# Patient Record
Sex: MALE | Race: OTHER | HISPANIC OR LATINO | URBAN - METROPOLITAN AREA
[De-identification: names, ages, dates, MRNs, and addresses within clinical notes are randomized per-mention and may not be internally consistent; named-entity substitution may affect disease eponyms.]

---

## 2023-08-02 ENCOUNTER — EMERGENCY (EMERGENCY)
Facility: HOSPITAL | Age: 48
LOS: 1 days | Discharge: HOME | End: 2023-08-02
Attending: STUDENT IN AN ORGANIZED HEALTH CARE EDUCATION/TRAINING PROGRAM
Payer: MEDICAID

## 2023-08-02 VITALS
HEART RATE: 54 BPM | SYSTOLIC BLOOD PRESSURE: 144 MMHG | TEMPERATURE: 98 F | DIASTOLIC BLOOD PRESSURE: 68 MMHG | RESPIRATION RATE: 18 BRPM | OXYGEN SATURATION: 96 %

## 2023-08-02 VITALS
HEIGHT: 72 IN | HEART RATE: 60 BPM | RESPIRATION RATE: 18 BRPM | TEMPERATURE: 98 F | OXYGEN SATURATION: 96 % | SYSTOLIC BLOOD PRESSURE: 146 MMHG | DIASTOLIC BLOOD PRESSURE: 88 MMHG | WEIGHT: 270.07 LBS

## 2023-08-02 LAB
ALBUMIN SERPL ELPH-MCNC: 3.4 G/DL — LOW (ref 3.5–5)
ALP SERPL-CCNC: 65 U/L — SIGNIFICANT CHANGE UP (ref 40–120)
ALT FLD-CCNC: 40 U/L DA — SIGNIFICANT CHANGE UP (ref 10–60)
ANION GAP SERPL CALC-SCNC: 4 MMOL/L — LOW (ref 5–17)
AST SERPL-CCNC: 16 U/L — SIGNIFICANT CHANGE UP (ref 10–40)
BASOPHILS # BLD AUTO: 0.04 K/UL — SIGNIFICANT CHANGE UP (ref 0–0.2)
BASOPHILS NFR BLD AUTO: 0.8 % — SIGNIFICANT CHANGE UP (ref 0–2)
BILIRUB SERPL-MCNC: 0.3 MG/DL — SIGNIFICANT CHANGE UP (ref 0.2–1.2)
BUN SERPL-MCNC: 19 MG/DL — HIGH (ref 7–18)
CALCIUM SERPL-MCNC: 8.8 MG/DL — SIGNIFICANT CHANGE UP (ref 8.4–10.5)
CHLORIDE SERPL-SCNC: 105 MMOL/L — SIGNIFICANT CHANGE UP (ref 96–108)
CO2 SERPL-SCNC: 27 MMOL/L — SIGNIFICANT CHANGE UP (ref 22–31)
CREAT SERPL-MCNC: 0.85 MG/DL — SIGNIFICANT CHANGE UP (ref 0.5–1.3)
EGFR: 109 ML/MIN/1.73M2 — SIGNIFICANT CHANGE UP
EOSINOPHIL # BLD AUTO: 0.03 K/UL — SIGNIFICANT CHANGE UP (ref 0–0.5)
EOSINOPHIL NFR BLD AUTO: 0.6 % — SIGNIFICANT CHANGE UP (ref 0–6)
GLUCOSE SERPL-MCNC: 106 MG/DL — HIGH (ref 70–99)
HCT VFR BLD CALC: 45.8 % — SIGNIFICANT CHANGE UP (ref 39–50)
HGB BLD-MCNC: 14.9 G/DL — SIGNIFICANT CHANGE UP (ref 13–17)
IMM GRANULOCYTES NFR BLD AUTO: 0.2 % — SIGNIFICANT CHANGE UP (ref 0–0.9)
LYMPHOCYTES # BLD AUTO: 2.27 K/UL — SIGNIFICANT CHANGE UP (ref 1–3.3)
LYMPHOCYTES # BLD AUTO: 45.7 % — HIGH (ref 13–44)
MAGNESIUM SERPL-MCNC: 2.1 MG/DL — SIGNIFICANT CHANGE UP (ref 1.6–2.6)
MCHC RBC-ENTMCNC: 28.3 PG — SIGNIFICANT CHANGE UP (ref 27–34)
MCHC RBC-ENTMCNC: 32.5 GM/DL — SIGNIFICANT CHANGE UP (ref 32–36)
MCV RBC AUTO: 87.1 FL — SIGNIFICANT CHANGE UP (ref 80–100)
MONOCYTES # BLD AUTO: 0.51 K/UL — SIGNIFICANT CHANGE UP (ref 0–0.9)
MONOCYTES NFR BLD AUTO: 10.3 % — SIGNIFICANT CHANGE UP (ref 2–14)
NEUTROPHILS # BLD AUTO: 2.11 K/UL — SIGNIFICANT CHANGE UP (ref 1.8–7.4)
NEUTROPHILS NFR BLD AUTO: 42.4 % — LOW (ref 43–77)
NRBC # BLD: 0 /100 WBCS — SIGNIFICANT CHANGE UP (ref 0–0)
PLATELET # BLD AUTO: 214 K/UL — SIGNIFICANT CHANGE UP (ref 150–400)
POTASSIUM SERPL-MCNC: 4 MMOL/L — SIGNIFICANT CHANGE UP (ref 3.5–5.3)
POTASSIUM SERPL-SCNC: 4 MMOL/L — SIGNIFICANT CHANGE UP (ref 3.5–5.3)
PROT SERPL-MCNC: 6.9 G/DL — SIGNIFICANT CHANGE UP (ref 6–8.3)
RBC # BLD: 5.26 M/UL — SIGNIFICANT CHANGE UP (ref 4.2–5.8)
RBC # FLD: 13.6 % — SIGNIFICANT CHANGE UP (ref 10.3–14.5)
SODIUM SERPL-SCNC: 136 MMOL/L — SIGNIFICANT CHANGE UP (ref 135–145)
TROPONIN I, HIGH SENSITIVITY RESULT: 5.7 NG/L — SIGNIFICANT CHANGE UP
WBC # BLD: 4.97 K/UL — SIGNIFICANT CHANGE UP (ref 3.8–10.5)
WBC # FLD AUTO: 4.97 K/UL — SIGNIFICANT CHANGE UP (ref 3.8–10.5)

## 2023-08-02 PROCEDURE — 99053 MED SERV 10PM-8AM 24 HR FAC: CPT

## 2023-08-02 PROCEDURE — 93005 ELECTROCARDIOGRAM TRACING: CPT

## 2023-08-02 PROCEDURE — 99285 EMERGENCY DEPT VISIT HI MDM: CPT

## 2023-08-02 PROCEDURE — 99284 EMERGENCY DEPT VISIT MOD MDM: CPT | Mod: 25

## 2023-08-02 PROCEDURE — 36415 COLL VENOUS BLD VENIPUNCTURE: CPT

## 2023-08-02 PROCEDURE — 85025 COMPLETE CBC W/AUTO DIFF WBC: CPT

## 2023-08-02 PROCEDURE — 84484 ASSAY OF TROPONIN QUANT: CPT

## 2023-08-02 PROCEDURE — 80053 COMPREHEN METABOLIC PANEL: CPT

## 2023-08-02 PROCEDURE — 96374 THER/PROPH/DIAG INJ IV PUSH: CPT

## 2023-08-02 PROCEDURE — 83735 ASSAY OF MAGNESIUM: CPT

## 2023-08-02 PROCEDURE — 96375 TX/PRO/DX INJ NEW DRUG ADDON: CPT

## 2023-08-02 RX ORDER — METOCLOPRAMIDE HCL 10 MG
10 TABLET ORAL ONCE
Refills: 0 | Status: COMPLETED | OUTPATIENT
Start: 2023-08-02 | End: 2023-08-02

## 2023-08-02 RX ORDER — KETOROLAC TROMETHAMINE 30 MG/ML
30 SYRINGE (ML) INJECTION ONCE
Refills: 0 | Status: DISCONTINUED | OUTPATIENT
Start: 2023-08-02 | End: 2023-08-02

## 2023-08-02 RX ORDER — SODIUM CHLORIDE 9 MG/ML
1000 INJECTION INTRAMUSCULAR; INTRAVENOUS; SUBCUTANEOUS ONCE
Refills: 0 | Status: COMPLETED | OUTPATIENT
Start: 2023-08-02 | End: 2023-08-02

## 2023-08-02 RX ADMIN — Medication 30 MILLIGRAM(S): at 08:46

## 2023-08-02 RX ADMIN — Medication 10 MILLIGRAM(S): at 08:46

## 2023-08-02 RX ADMIN — SODIUM CHLORIDE 1000 MILLILITER(S): 9 INJECTION INTRAMUSCULAR; INTRAVENOUS; SUBCUTANEOUS at 08:46

## 2023-08-02 NOTE — ED PROVIDER NOTE - PATIENT PORTAL LINK FT
You can access the FollowMyHealth Patient Portal offered by [facility] by registering at the following website: [email or web address]. By joining [facility]’s FollowMyHealth portal, you will also be able to view your health information using other applications (apps) compatible with our system.

## 2023-08-02 NOTE — ED ADULT NURSE NOTE - CHIEF COMPLAINT QUOTE
Pt c/o nose bleed that started at 4:30am this morning, no bleeding noted pt c/o feeling dizzy "like the room is moving' and headache.

## 2023-08-02 NOTE — ED ADULT NURSE NOTE - NSFALLRISKINTERV_ED_ALL_ED
Assistance OOB with selected safe patient handling equipment if applicable/Assistance with ambulation/Communicate fall risk and risk factors to all staff, patient, and family/Encourage patient to sit up slowly, dangle for a short time, stand at bedside before walking/Monitor gait and stability/Orthostatic vital signs/Provide patient with walking aids/Provide visual cue: yellow wristband, yellow gown, etc/Reinforce activity limits and safety measures with patient and family/Call bell, personal items and telephone in reach/Instruct patient to call for assistance before getting out of bed/chair/stretcher/Non-slip footwear applied when patient is off stretcher/[place] to call system/Physically safe environment - no spills, clutter or unnecessary equipment/Purposeful Proactive Rounding/Room/bathroom lighting operational, light cord in reach

## 2023-08-02 NOTE — ED PROVIDER NOTE - PROGRESS NOTE DETAILS
patient and wife updated on results. symptoms improved.  stable for outpatient followup. leno Louise

## 2023-08-02 NOTE — ED ADULT TRIAGE NOTE - CHIEF COMPLAINT QUOTE
Pt c/o nose bleed that started at 4:30am this morning, no bleeding noted pt c/o feeling dizzy "like the room is moving" Pt c/o nose bleed that started at 4:30am this morning, no bleeding noted pt c/o feeling dizzy "like the room is moving' and headache.

## 2023-08-02 NOTE — ED PROVIDER NOTE - CLINICAL SUMMARY MEDICAL DECISION MAKING FREE TEXT BOX
47-year-old male presenting with headache, dizziness and nosebleed.  No active epistaxis on exam.  Symptoms likely secondary to tension versus migraine headache.  Will get labs to assess.

## 2023-08-02 NOTE — ED PROVIDER NOTE - OBJECTIVE STATEMENT
47-year-old male, no significant PMH, presenting with nosebleed.  Patient provided by wife at bedside.  Yesterday patient had a 15-minute episode of nosebleeding which stopped.  Overnight he had approxi-2 hours of nosebleeding and also developed headache, dizziness and was vomiting blood.  Also reporting some chest pain.

## 2023-08-02 NOTE — ED ADULT NURSE NOTE - FINAL NURSING ELECTRONIC SIGNATURE
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 MRI not showing acute disease, likely chronic complete stenosis.  - f/u repeat MRI 02-Aug-2023 12:08

## 2023-08-02 NOTE — ED PROVIDER NOTE - PHYSICAL EXAMINATION
General: uncomfortable appearing male, no acute distress   HEENT: normocephalic, atraumatic   Respiratory: normal work of breathing  Cardiac: regular rate and rhythm   MSK: no swelling or tenderness of lower extremities, moving all extremities spontaneously   Skin: warm, dry   Neuro: A&Ox3  Psych: appropriate affect

## 2023-08-02 NOTE — ED PROVIDER NOTE - NSFOLLOWUPINSTRUCTIONS_ED_ALL_ED_FT
You were seen in the emergency department for headache and nosebleed.    Please follow-up with your primary care doctor within the next 48 hours.     If you have any worsening symptoms, dizziness, severe chest pain, trouble breathing, please return to the emergency department.

## 2023-08-02 NOTE — ED ADULT NURSE NOTE - OBJECTIVE STATEMENT
Pt c/o on and off nose bleeds x couple days, worsened today. Pt c/o of dizziness and headache. No active bleeding at this time. No acute distress noted, no SOB noted.

## 2024-05-31 NOTE — ED ADULT TRIAGE NOTE - SPO2 (%)
96
[de-identified] : The patient is feeling better regarding their right hamstring strain with underlying arthritis. I suggested the patient continues home exercises and focuses on flexibility.   Patient can continue home exercises, Tylenol, weight management and activities as tolerated. All questions were answered, understanding verbalized. Patient is in agreement with plan of treatment. Patient may follow up as needed.

## 2024-06-21 ENCOUNTER — EMERGENCY (EMERGENCY)
Facility: HOSPITAL | Age: 49
LOS: 1 days | Discharge: HOME | End: 2024-06-21
Attending: STUDENT IN AN ORGANIZED HEALTH CARE EDUCATION/TRAINING PROGRAM
Payer: MEDICAID

## 2024-06-21 VITALS
TEMPERATURE: 98 F | HEIGHT: 70.47 IN | HEART RATE: 64 BPM | SYSTOLIC BLOOD PRESSURE: 132 MMHG | OXYGEN SATURATION: 99 % | WEIGHT: 281.97 LBS | DIASTOLIC BLOOD PRESSURE: 75 MMHG | RESPIRATION RATE: 16 BRPM

## 2024-06-21 PROCEDURE — 12001 RPR S/N/AX/GEN/TRNK 2.5CM/<: CPT

## 2024-06-21 PROCEDURE — 99284 EMERGENCY DEPT VISIT MOD MDM: CPT | Mod: 25

## 2024-06-21 PROCEDURE — 90715 TDAP VACCINE 7 YRS/> IM: CPT

## 2024-06-21 PROCEDURE — 99283 EMERGENCY DEPT VISIT LOW MDM: CPT | Mod: 25

## 2024-06-21 PROCEDURE — 90471 IMMUNIZATION ADMIN: CPT

## 2024-06-21 RX ORDER — TETANUS TOXOID, REDUCED DIPHTHERIA TOXOID AND ACELLULAR PERTUSSIS VACCINE, ADSORBED 5; 2.5; 8; 8; 2.5 [IU]/.5ML; [IU]/.5ML; UG/.5ML; UG/.5ML; UG/.5ML
0.5 SUSPENSION INTRAMUSCULAR ONCE
Refills: 0 | Status: COMPLETED | OUTPATIENT
Start: 2024-06-21 | End: 2024-06-21

## 2024-06-21 RX ORDER — LIDOCAINE HCL 20 MG/ML
6 VIAL (ML) INJECTION ONCE
Refills: 0 | Status: COMPLETED | OUTPATIENT
Start: 2024-06-21 | End: 2024-06-21

## 2024-06-21 RX ADMIN — Medication 6 MILLILITER(S): at 20:39

## 2024-06-21 RX ADMIN — TETANUS TOXOID, REDUCED DIPHTHERIA TOXOID AND ACELLULAR PERTUSSIS VACCINE, ADSORBED 0.5 MILLILITER(S): 5; 2.5; 8; 8; 2.5 SUSPENSION INTRAMUSCULAR at 20:44

## 2024-06-21 NOTE — ED ADULT NURSE NOTE - NSFALLUNIVINTERV_ED_ALL_ED
Bed/Stretcher in lowest position, wheels locked, appropriate side rails in place/Call bell, personal items and telephone in reach/Instruct patient to call for assistance before getting out of bed/chair/stretcher/Non-slip footwear applied when patient is off stretcher/[place] to call system/Physically safe environment - no spills, clutter or unnecessary equipment/Purposeful proactive rounding/Room/bathroom lighting operational, light cord in reach

## 2024-06-21 NOTE — ED PROVIDER NOTE - OBJECTIVE STATEMENT
48-year-old male no pertinent past medical history presenting to emergency department with laceration to left hand.  Patient states he was cutting zip ties when the knife slipped and  cut his hand.  Denies difficulty moving left thumb, numbness, fever, other complaint.  Unsure of last Tdap.

## 2024-06-21 NOTE — ED PROVIDER NOTE - CLINICAL SUMMARY MEDICAL DECISION MAKING FREE TEXT BOX
48-year-old male no pertinent past medical history presenting to emergency department with laceration to left hand.  Patient states he was cutting zip ties when the knife slipped and  cut his hand.  Denies difficulty moving left thumb, numbness, fever, other complaint.  Unsure of last Tdap. PE as above, wound will be irrigated, repaired, update tdap. Return precautions.

## 2024-06-21 NOTE — ED PROVIDER NOTE - PATIENT PORTAL LINK FT
You can access the FollowMyHealth Patient Portal offered by [facility] by registering at the following website: [email or web address]. By joining [facility]’s FollowMyHealth portal, you will also be able to view your health information using other applications (apps) compatible with our system.

## 2024-06-21 NOTE — ED PROVIDER NOTE - PHYSICAL EXAMINATION
Gen: NAD, AOx3, able to make needs known, non-toxic  Lung: CTAB, no respiratory distress, no wheezes/rhonchi/rales B/L, speaking in full sentences  CV: RRR, no murmurs  MSK: no visible deformities, FROM left thumb, can flex/extend/oppose,   Neuro: Appears non focal  Skin: 2.5cm linear laceration to left thenar eminence   Psych: normal affect

## 2024-06-21 NOTE — ED PROVIDER NOTE - NSFOLLOWUPINSTRUCTIONS_ED_ALL_ED_FT
1) Follow up with your doctor as discussed. Return to ED in 10-14 days for suture removal.  2) Return to the ED immediately for new or worsening symptoms like fever, redness, purulent drainage  3) Please continue to take any home medications as prescribed  4) Your test results from your ED visit were discussed with you prior to discharge 1) Follow up with your doctor as discussed. Return to ED in 10-14 days for suture removal.  2) Return to the ED immediately for new or worsening symptoms like fever, redness, purulent drainage  3) Please continue to take any home medications as prescribed  4) Your test results from your ED visit were discussed with you prior to discharge      1) Janeen un seguimiento con freeman médico según lo comentado. Regrese al servicio de urgencias en 10 a 14 días para retirar la sutura.  2) Regrese al servicio de urgencias inmediatamente si presenta síntomas nuevos o que empeoran, cori fiebre, enrojecimiento o secreción purulenta.  3) Continúe tomando los medicamentos caseros según lo recetado.  4) Los resultados de las pruebas de freeman visita al servicio de urgencias se analizaron con usted antes del denis.      Cuidado de las heridas suturadas  Sutured Wound Care  Las suturas son puntos que pueden usarse para cerrar heridas. Las suturas vienen en diferentes materiales. Pueden desintegrarse a medida que la herida cicatriza (suturas absorbibles) o puede ser necesario quitarlas (suturas no reabsorbibles). El cuidado correcto de las heridas puede ayudar a evitar el dolor y a prevenir las infecciones. Además, puede ayudar a que la cicatrización sea más rápida. Siga las instrucciones del médico acerca del cuidado de la herida suturada.    Materiales necesarios:  [place] y jabón.  Lavon toalla limpia y seca.  Limpiador de heridas o solución salina, si es necesario.  Lavon gasa o venda limpia (vendaje), si es necesario.  Pomada con antibiótico, si se lo indicó el médico.  Cómo cuidar de la herida suturada  Two stitched wounds. One is normal. The other is red with pus and infected.  Mantenga la herida completamente seca qian las primeras 24 horas o qian el tiempo que le haya indicado el médico. Después de 24 a 48 horas, puede ducharse o bañarse cori se lo haya indicado el médico. No moje ni sumerja la herida en agua hasta que le hayan quitado las suturas.  Después de las primeras 24 horas, limpie la herida lavon vez al día, o con la frecuencia que le haya indicado el médico. Siga estos pasos:  Lave y enjuague la herida cori se lo haya indicado el médico.  Séquela dando palmaditas con lavon toalla limpia. No frote la herida.  Después de limpiar la herida, aplique lavon delgada capa de ungüento con antibiótico cori se lo haya indicado el médico. [place] evitará infecciones y hará que el vendaje no se adhiera a la herida.  Siga las instrucciones del médico acerca de cómo cambiar el vendaje. Asegúrese de hacer lo siguiente:  Lávese las nathaniel con agua y jabón qian al menos 20 segundos antes y después de cambiar el vendaje. Use desinfectante para nathaniel si no dispone de agua y jabón.  Cambie el vendaje al menos lavon vez al día o con la frecuencia que le haya indicado el médico. Si el vendaje se moja o se ensucia, cámbielo.  No quite las suturas ni otros cierres cutáneos, cori tiras adhesivas o goma para cerrar la piel. Es posible que estos cierres cutáneos deban quedar puestos en la piel qian 2 semanas o más tiempo. Si los bordes de las tiras adhesivas empiezan a despegarse y enroscarse, puede recortar los que estén sueltos. No retire las tiras adhesivas por completo a menos que el médico se lo indique.  Controle la herida todos los días para detectar signos de infección. Esté atento a lo siguiente:  Enrojecimiento, hinchazón o dolor.  Líquido o hussein.  Calor, erupción cutánea o dureza en el lugar de la herida de reciente aparición.  Pus o mal olor.  Janeen que le retiren las suturas cori se lo haya indicado el médico.  Siga estas instrucciones en freeman casa:  Medicamentos    [place] o aplíquese los medicamentos de venta viviane y los recetados solamente cori se lo haya indicado el médico.  Si le recetaron un medicamento o ungüento con antibiótico, tómelo o aplíqueselo cori se lo haya indicado el médico. No deje de usar el antibiótico aunque la afección mejore.  Instrucciones generales    Para ayudar a reducir la formación de cicatrices después de que freeman herida sane, cubra la herida con ropa o aplíquese pantalla solar con factor de protección solar (FPS) 30, cori mínimo, siempre que esté al aire viviane.  No se rasque ni se toque la herida.  No estire la herida.  Cuando esté sentado o acostado, levante (eleve) la dafne de la lesión por encima del nivel del corazón, si es posible.  Siga lavon dieta que incluya proteínas, vitaminas A y vitamina C que ayudarán a que la herida cicatrice.  Cynthia suficiente líquido cori para mantener la orina de color amarillo pálido.  Concurra a todas las visitas de seguimiento. [place] es importante.  Comuníquese con un médico si:  Le aplicaron la vacuna antitetánica y tiene hinchazón, dolor intenso, enrojecimiento o hemorragia en el sitio de la inyección.  La herida se le abre o nota un cuerpo extraño que sale de zoran, cori un trozo de andres o jose.  Tiene cualquiera de estos signos de infección:  Enrojecimiento, hinchazón o dolor alrededor de la herida.  Líquido o hussein que salen de la herida.  Calor, erupción cutánea o dureza alrededor de la herida de reciente aparición.  Fiebre.  La piel alrededor de la herida cambia de color.  Freeman dolor no se eddie con medicamentos.  Presenta adormecimiento alrededor de la herida.  Solicite ayuda de inmediato si:  Tiene mucha hinchazón o más dolor alrededor de la herida.  Observa pus o percibe mal olor que salen de la herida.  Palpa nódulos dolorosos cerca de la herida o en cualquier dafne del cuerpo.  Tiene lavon línea joseph que se extiende desde la herida.  La herida está en la mano o el pie y:  Los dedos se annie pálidos o azulados.  No puede  de forma adecuada un dedo de la mano o del pie.  Tiene adormecimiento que llega hasta la mano, el pie o los dedos.  Resumen  Las suturas son puntos que pueden usarse para cerrar heridas.  El cuidado correcto de las heridas puede ayudar a evitar el dolor y a prevenir las infecciones.  Mantenga la herida completamente seca qian las primeras 24 horas o qian el tiempo que le haya indicado el médico. Después de 24 a 48 horas, puede ducharse o bañarse cori se lo haya indicado el médico.  Para ayudar con la cicatrización, coma alimentos con alto contenido de proteínas, vitamina A y vitamina C.  Esta información no tiene cori fin reemplazar el consejo del médico. Asegúrese de hacerle al médico cualquier pregunta que tenga.

## 2024-06-21 NOTE — ED ADULT NURSE NOTE - OBJECTIVE STATEMENT
47 yo male presents to the ED for laceration on the left hand that he incurred with a knife while cutting zip ties tonight.